# Patient Record
Sex: FEMALE | Race: WHITE | NOT HISPANIC OR LATINO | Employment: FULL TIME | URBAN - NONMETROPOLITAN AREA
[De-identification: names, ages, dates, MRNs, and addresses within clinical notes are randomized per-mention and may not be internally consistent; named-entity substitution may affect disease eponyms.]

---

## 2021-01-07 ENCOUNTER — HOSPITAL ENCOUNTER (OUTPATIENT)
Dept: CT IMAGING | Facility: HOSPITAL | Age: 31
Discharge: HOME | End: 2021-01-07
Admitting: NURSE PRACTITIONER

## 2021-01-07 ENCOUNTER — TRANSCRIBE ORDERS (OUTPATIENT)
Dept: ADMINISTRATIVE | Facility: HOSPITAL | Age: 31
End: 2021-01-07

## 2021-01-07 DIAGNOSIS — R30.0 DYSURIA: ICD-10-CM

## 2021-01-07 DIAGNOSIS — R31.0 HEMATURIA, GROSS: ICD-10-CM

## 2021-01-07 DIAGNOSIS — R10.9 FLANK PAIN: ICD-10-CM

## 2021-01-07 DIAGNOSIS — R31.0 HEMATURIA, GROSS: Primary | ICD-10-CM

## 2021-01-07 PROCEDURE — 74176 CT ABD & PELVIS W/O CONTRAST: CPT

## 2022-06-13 ENCOUNTER — E-VISIT (OUTPATIENT)
Dept: ADMINISTRATIVE | Facility: OTHER | Age: 32
End: 2022-06-13

## 2022-06-13 NOTE — E-VISIT ESCALATED
Chief Complaint: Eye pain, eye bump, or irritation   Patient was shown the following escalation message:   Some conditions need a visit with a healthcare provider   Severe eyelid swelling may suggest a complicated condition. You should speak with a provider to get care.   ----------   Patient Interview Transcript:   Which of these symptoms are bothering you? Select all that apply.    Eyelid swelling    Eyelid redness   Not selected:    Redness in the whites of the eye(s)    Eye drainage (such as excess tears, mucus, or pus)    Crusting of the eyelids or eyelashes    Bump or pimple on or inside the eyelid    Eye dryness    None of the above   Do you have any of these eye symptoms? Select all that apply.    Itchiness    Eyelid pain   Not selected:    Burning, theresa, or gritty feeling    Flaking or dry skin on the eyelid    Pain inside the eyeball    None of the above   How long have you had your eye symptoms? Select one.    More than 7 days   Not selected:    Less than 1 day    1 to 3 days    4 to 7 days   Which eye is affected? Select one.    My right eye   Not selected:    My left eye    Both eyes    It started in one eye, but now both eyes are affected   Does it feel like there's something in your eye that's making it hard to open your eye or keep it open? Select one.    No   Not selected:    Yes   How would you describe your eyelid pain? Select one.    Moderate; it gets in the way of my daily activities   Not selected:    Mild    Severe; I can't open my eye(s)   Is the skin around your eye red, swollen, or warm to the touch? Pay special attention to the area above your eyelid, the upper cheek, forehead, and the top part of the nose (between the eyes). Select one.    Yes   Not selected:    No   You mentioned that your eyelid is swollen. How would you describe it? Select one.    Swollen, and it's hard to open my eye   Not selected:    A little puffy, but I can easily open my eye    My eye is completely swollen  shut   ----------   Medical history   Medical history data does not currently exist for this patient.